# Patient Record
Sex: MALE | Race: WHITE
[De-identification: names, ages, dates, MRNs, and addresses within clinical notes are randomized per-mention and may not be internally consistent; named-entity substitution may affect disease eponyms.]

---

## 2020-02-02 ENCOUNTER — HOSPITAL ENCOUNTER (EMERGENCY)
Dept: HOSPITAL 25 - UCEAST | Age: 60
Discharge: HOME | End: 2020-02-02
Payer: COMMERCIAL

## 2020-02-02 VITALS — SYSTOLIC BLOOD PRESSURE: 139 MMHG | DIASTOLIC BLOOD PRESSURE: 68 MMHG

## 2020-02-02 DIAGNOSIS — J98.01: Primary | ICD-10-CM

## 2020-02-02 PROCEDURE — 99212 OFFICE O/P EST SF 10 MIN: CPT

## 2020-02-02 PROCEDURE — 71046 X-RAY EXAM CHEST 2 VIEWS: CPT

## 2020-02-02 PROCEDURE — G0463 HOSPITAL OUTPT CLINIC VISIT: HCPCS

## 2020-02-02 NOTE — XMS REPORT
Summary of Care

 Created on:2020



Patient:Janki Georges

Sex:Male

:1960

External Reference #:[id number]





Demographics







 Address  [address]



   [place] [address]

 

 Home Phone  [phone]

 

 Work Phone  [phone]

 

 Mobile Phone  [phone]

 

 Email Address  [email or web address]

 

 Preferred Language  English

 

 Marital Status  Not  or 

 

 Amish Affiliation  Unknown

 

 Race  White

 

 Ethnic Group  Not  or 









Author







 Organization  [facility]

 

 Address  [address] [address]









Support







 Name  Relationship  Address  Phone

 

 Lauren Georges  Unavailable  [address]  [phone]



     [place] [id number]  

 

 Lauren Georges  Unavailable  [address]  [phone]



     [place] [address]  









Care Team Providers







 Name  Role  Phone

 

 Russ Tatum  Primary Care Provider  [phone]









Reason for Visit







 Reason  Comments

 

 Congestion  4 weeks of nasal congestion w/ 1 week of chest congestion, cough, 
clear



   phlegm, and fatigue







Encounter Details







 Date  Type  Department  Care Team  Description

 

 2020  Office Visit  [place] Silvia Mishra,  Fluid level behind



     Practice



  PA-C



  tympanic membrane of



     [address]



  [address]



  both ears (Primary Dx)



     [place] [address]



  [place] [address]



  



     [phone] (Fax)  







Allergies

No Known Allergiesdocumented as of this encounter (statuses as of 2020)



Medications







 Medication  Sig  Dispensed  Refills  Start Date  End Date  Status

 

 Coenzyme Q10 (CO Q 10)  Take 500 mg by    0      Active



 10 MG Oral Cap  mouth.          

 

 Cholecalciferol  Take 2,500 Units    0      Active



 (VITAMIN D3) 2000 units  by mouth.          



 Oral Cap            

 

 albuterol HFA  Take 2 Puffs by  1 Inhaler  5  2017    Active



 (VENTOLIN) 108 (90  inhalation EVERY          



 Base) MCG/ACT  FOUR  HOURS AS          



 Inhalation Aero  NEEDED          



 SolnIndications:  (wheezing).          



 Dyspnea on effort            

 

 Omega-3 Fatty Acids  Take  by mouth.    0      Active



 (FISH OIL) 500 MG Oral            



 Cap            

 

 atorvastatin (LIPITOR)  Take 1 Tab by  30 Tab  5  2019    Active



 20 MG Oral Tab  mouth DAILY.          

 

 diclofenac (VOLTAREN)      0  01/15/2020    Active



 50 MG Oral Tab EC            



documented as of this encounter (statuses as of 2020)



Active Problems







 Problem  Noted Date

 

 Hyperlipidemia  2017



documented as of this encounter (statuses as of 2020)



Social History







 Tobacco Use  Types  Packs/Day  Years Used  Date

 

 Never Smoker        









 Smokeless Tobacco: Never Used      









 Alcohol Use  Drinks/Week  oz/Week  Comments

 

 Yes      









 Sex Assigned at Birth  Date Recorded

 

 Not on file  









 Job Start Date  Occupation  Industry

 

 Not on file  Not on file  Not on file









 Travel History  Travel Start  Travel End









 No recent travel history available.



documented as of this encounter



Last Filed Vital Signs







 Vital Sign  Reading  Time Taken  Comments

 

 Blood Pressure  126/78  2020  8:40 AM EST  

 

 Pulse  53  2020  8:40 AM EST  

 

 Temperature  35.8 

  2020  8:40 AM EST  



   C (96.5 

    



   F)    

 

 Respiratory Rate  -  -  

 

 Oxygen Saturation  98%  2020  8:40 AM EST  

 

 Inhaled Oxygen Concentration  -  -  

 

 Weight  95.3 kg (210 lb)  2020  8:40 AM EST  

 

 Height  172.7 cm (5' 8")  2020  8:40 AM EST  

 

 Body Mass Index  31.93  2020  8:40 AM EST  



documented in this encounter



Patient Instructions

Patient InstructionsDoSilvia lee PA-C - 2020  8:40 AM ESTDiscussed 
with patient, no antibiotics needed right now

Try OTC Claritin, zyrtec or sudafed, once daily x 5-7 days

Avoid creamy foods and drink

Rest, gargle with warm salt water

Push water, soup, juice, tea with honey/lemon

OTC Tylenol/Ibuprofen for fever/pain

Call if not improving or with any questions or concerns

F/U with Dr. TatumElectronically signed by Silvia Calderon PA-C at 2020  9:03 AM EST

documented in this encounter



Progress Notes

Silvia Calderon PA-C - 2020  8:40 AM ESTFormatting of this note might be 
different from the original.

PATIENT:  Janki Georges

MRN:  [id number]

:  1960

DATE OF SERVICE:  2020



REFERRING PRACTITIONER:  Self-Referred

PRIMARY CARE PROVIDER:  Russ Tatum



NEW TO THIS OFFICE -- Sees Dr. Tatum, but works at [employer]

Convenient to come here today



CHIEF COMPLAINT:

Chief Complaint

Patient presents with

 Congestion

  4 weeks of nasal congestion w/ 1 week of chest congestion, cough, clear phlegm
, and fatigue



Subjective

HISTORY OF PRESENT ILLNESS:

Janki Georges is a 59-y.o.  male who presents with nasal congestion, coughing, 
clear sputum, fatigue,post nasal drip x 4 weeks

At night symptoms are worse, post nasal drip constant

Did take OTC Mucinex PM for 2 weeks, worked well, not doing much the past week

Non-smoker

Home heat is hot water baseboard

Denies fever, chills, nausea, vomiting, diarrhea, chest pains, SOB





Past Medical History:

Diagnosis Date

 Actinic keratosis of multiple sites of head and neck

 High cholesterol



Past Surgical History:

Procedure Laterality Date

 [place] FEMUR/KNEE SURG UNLISTED Right 2007

 Meniscus repair

 [place] REMOVE KNEE CYST/GANGLION

 STAPEDECTOMY



Family History

Problem Relation Age of Onset

 Heart Mother

 Arthritis Father



Current Outpatient Medications

Medication Sig

 albuterol HFA (VENTOLIN) 108 (90 Base) MCG/ACT Inhalation Aero Soln Take 
2 Puffs by inhalation EVERY FOUR  HOURS AS NEEDED (wheezing).

 atorvastatin (LIPITOR) 20 MG Oral Tab Take 1 Tab by mouth DAILY.

 Cholecalciferol (VITAMIN D3) 2000 units Oral Cap Take 2,500 Units by 
mouth.

 Coenzyme Q10 (CO Q 10) 10 MG Oral Cap Take 500 mg by mouth.

 diclofenac (VOLTAREN) 50 MG Oral Tab EC

 Omega-3 Fatty Acids (FISH OIL) 500 MG Oral Cap Take  by mouth.



No current facility-administered medications for this visit.



No Known Allergies

Social History



Socioeconomic History

 Marital status: 

  Spouse name: Not on file

 Number of children: Not on file

 Years of education: Not on file

 Highest education level: Not on file

Occupational History

 Not on file

Social Needs

 Financial resource strain: Not on file

 Food insecurity

  Worry: Not on file

  Inability: Not on file

 Transportation needs

  Medical: Not on file

  Non-medical: Not on file

Tobacco Use

 Smoking status: Never Smoker

 Smokeless tobacco: Never Used

Substance and Sexual Activity

 Alcohol use: Yes

 Drug use: Not on file

 Sexual activity: Not on file

Lifestyle

 Physical activity

  Days per week: Not on file

  Minutes per session: Not on file

 Stress: Not on file

Relationships

 Social connections

  Talks on phone: Not on file

  Gets together: Not on file

  Attends Religion service: Not on file

  Active member of club or organization: Not on file

  Attends meetings of clubs or organizations: Not on file

  Relationship status: Not on file

 Intimate partner violence

  Fear of current or ex partner: Not on file

  Emotionally abused: Not on file

  Physically abused: Not on file

  Forced sexual activity: Not on file

Other Topics Concern

 Not on file

Social History Narrative

 Not on file



REVIEW OF SYSTEMS:

Skin: negative skin lesions

Eyes: negative visual blurring

Ears/Nose/Throat: positive rhinorrhea, sore throat, sinus pressure, post nasal 
drip

Respiratory: positive cough

Cardiovascular: negative chest pain

Gastrointestinal: negative abdominal pain, constipation, diarrhea, nausea or 
vomiting

Genitourinary: negative burning on urination, dysuria

Musculoskeletal: positive arthritis/joint pain

Neurologic: negative numbness or tingling of feet or hands

Psychiatric: negative anxiety

Hematologic/Lymphatic/Immunologic: negative allergies

Endocrine: negative diabetes or hot flashes/sweats



Objective

PHYSICAL EXAMINATION:

VITALS:  /78 (BP Location: Right arm, Patient Position: Sitting)  | Pulse 
53  | Temp 96.5 F (35.8 C)  | Ht 5' 8" (1.727 m)  | Wt 210 lb (95.3 kg)
  | SpO2 98%  | BMI 31.93 kg/m  Body mass index is 31.93 kg/m.

General appearance: alert, mild distress, cooperative, oriented times 3

Skin: Skin color, texture, turgor normal. No rashes or lesions.

Head: Normocephalic. No masses, lesions, tenderness or abnormalities

Eyes: conjunctivae/corneas clear. PERRL, EOM's intact.

Ears: positive findings: TMs bulging mildly, fluid present behind both TMs, 
Left more than right

Nose/Sinuses: positive findings: mucosa mild erythematous, no rhinorrhea

Oropharynx: positive findings: mild oropharyngeal erythema, post nasal drip 
present

Neck: Neck supple, FROM. No cervical or supraclavicular adenopathy.

Lungs: Lungs clear. Chest symmetrical. Normal breath sounds.

Heart: RRR. No murmur, clicks or gallops. No peripheral edema

.



IMPRESSION:

  ICD-9-CM ICD-10-CM

1. Fluid level behind tympanic membrane of both ears 381.4 H65.93





  Plan

PLAN:

Discussed with patient, no antibiotics needed right now

Try OTC Claritin, zyrtec or sudafed, once daily x 5-7 days

Avoid creamy foods and drink

Rest, gargle with warm salt water

Push water, soup, juice, tea with honey/lemon

OTC Tylenol/Ibuprofen for fever/pain

Call if not improving or with any questions or concerns

F/U with Dr. Tatum



Author:  Silvia Calderon PA-C 2020 08:36Electronically signed by Silvia Calderon PA-C at 2020  9:03 AM ESTdocumented in this encounter



Plan of Treatment







 Date  Type  Specialty  Care Team  Description

 

 2020  Office Visit  Dermatology  Melissa Singh PA-C



  



       [address] [place] [address]



  



       [phone] (Fax)  









 Health Maintenance  Due Date  Last Done  Comments

 

 DTaP/Tdap/Td Vaccines (1 -  1971    



 Tdap)      

 

 ZOSTER IMMUNIZATION SERIES  2010    



 (1 of 2)      

 

 INFLUENZA VACCINE (#1)  2019    

 

 DIABETES SCREENING  2020, 2019,  



     2018, Additional  



     history exists  

 

 DEPRESSION SCREENING  2021  

 

 Colonoscopy  05/15/2024  05/15/2014 (Previously  



     completed)  

 

 LIPID DISORDER SCREENING  2024, 2019,  



     2018, Additional  



     history exists  

 

 HEPATITIS A IMMUNIZATION  Aged Out    No longer eligible



 SERIES      based on patient's age



       to complete this topic

 

 HPV IMMUNIZATION SERIES  Aged Out    No longer eligible



       based on patient's age



       to complete this topic

 

 MENINGOCOCCAL VACCINE IMM  Aged Out    No longer eligible



       based on patient's age



       to complete this topic

 

 PNEUMOCOCCAL 0-64 YRS  Aged Out    No longer eligible



       based on patient's age



       to complete this topic



documented as of this encounter



Results

Not on filedocumented in this encounter



Visit Diagnoses







 Diagnosis

 

 Fluid level behind tympanic membrane of both ears



documented in this encounter



Insurance







 Payer  Benefit Plan /  Subscriber ID  Effective Dates  Phone  Address  Type



   Group          

 

 [employer]  [employer]  xxxxxxxxxxx  2008-Present      [employer]









 Guarantor Name  Account Type  Relation to  Date of Birth  Phone  Billing



     Patient      Address

 

 Janki Georges  Personal/Family    1960  [phone] [id number]



         (Home)



  [place] [id number] [place], [place] [address])  [place] [address]



documented as of this encounter

## 2020-02-02 NOTE — UC
Respiratory Complaint HPI





- HPI Summary


HPI Summary: 





He had a URI a couple weeks ago.  He went to [facility] and they didn't find 

anything wrong or deep some Sudafed.  He complains of some wheezing and short 

of breath or is exerting himself outdoors.





- History of Current Complaint


Chief Complaint: UCRespiratory


Stated Complaint: COUGH


Time Seen by Provider: 02/02/20 20:17


Hx Obtained From: Patient


Onset/Duration: Gradual Onset, Lasting Weeks


Timing: Constant


Severity Initially: Mild


Severity Currently: Mild


Pain Intensity: 0


Character: Cough: Nonproductive


Aggravating Factors: Other - Going outdoors


Associated Signs And Symptoms: Positive: Wheezing





- Allergies/Home Medications


Allergies/Adverse Reactions: 


 Allergies











Allergy/AdvReac Type Severity Reaction Status Date / Time


 


No Known Allergies Allergy   Verified 01/06/20 10:41











Home Medications: 


 Home Medications





Diphenhydram/PE/Dm/Acetamin/GG [Mucinex Fast-Max Day-Nite Liq]  02/02/20 [

History]











PMH/Surg Hx/FS Hx/Imm Hx


Previously Healthy: Yes





- Surgical History


Surgical History: Yes


Surgery Procedure, Year, and Place: RIGHT EAR SURGERY-1998 STAPEDECTOMY - MRI 

SAFE TO 1.5 ONLY [facility] **OP REPORT SCANNED INTO EMR**.  2010- 

RIGHT KNEE - MMT.  TONSILECTOMY.  





- Family History


Known Family History: Positive: Unknown





- Social History


Alcohol Use: Weekly


Substance Use Type: None


Smoking Status (MU): Never Smoked Tobacco





Review of Systems


All Other Systems Reviewed And Are Negative: Yes


Constitutional: Positive: Negative


Skin: Positive: Negative


ENT: Positive: Negative


Respiratory: Positive: Shortness Of Breath, Cough - Her doctor's office and


Cardiovascular: Positive: Negative


Gastrointestinal: Positive: Negative


Neurological: Positive: Negative





Physical Exam





- Summary


Physical Exam Summary: 





He is nontoxic in appearance with stable vitals.


Triage Information Reviewed: Yes


Appearance: Well-Appearing, No Pain Distress


Vital Signs: 


 Initial Vital Signs











Temp  97.9 F   02/02/20 20:10


 


Pulse  71   02/02/20 20:10


 


Resp  18   02/02/20 20:10


 


BP  139/68   02/02/20 20:10


 


Pulse Ox  94   02/02/20 20:10











Vital Signs Reviewed: Yes


Eye Exam: Normal


ENT Exam: Normal


ENT: Positive: Normal ENT inspection


Neck exam: Normal


Neck: Positive: Supple


Respiratory: Positive: Wheezing - Mild inspiratory wheezes at both bases


Cardiovascular Exam: Normal


Abdominal Exam: Normal


Musculoskeletal Exam: Normal


Musculoskeletal: Negative: Edema @


Skin Exam: Normal





Diagnostics





- Radiology


  ** CXR


Radiology Interpretation Completed By: ED Physician


Summary of Radiographic Findings: No acute process





Respiratory Course/Dx





- Course


Course Of Treatment: 





Improved significantly subjectively with a DuoNeb.  I couldn't hear any wheezes 

subsequent to the medication however I didn't think that I heard a few crackles 

at both bases.  Therefore chest x-ray was obtained that didn't find any acute 

pathology on it.  I will treat him symptomatically with an albuterol inhaler to 

go and recommended follow-up with his PCP if not improved.  This may just be 

irritation left over from his recent URI.





- Differential Dx/Diagnosis


Provider Diagnosis: 


 Bronchospasm








Discharge ED





- Sign-Out/Discharge


Documenting (check all that apply): Patient Departure


All imaging exams completed and their final reports reviewed: Yes





- Discharge Plan


Condition: Stable


Disposition: HOME


Patient Education Materials:  Bronchospasm (ED)


Referrals: 


Deepti DELCID,Russ GROVES [Primary Care Provider] - 





- Billing Disposition and Condition


Condition: STABLE


Disposition: Home